# Patient Record
Sex: FEMALE | Race: BLACK OR AFRICAN AMERICAN | NOT HISPANIC OR LATINO | URBAN - METROPOLITAN AREA
[De-identification: names, ages, dates, MRNs, and addresses within clinical notes are randomized per-mention and may not be internally consistent; named-entity substitution may affect disease eponyms.]

---

## 2022-07-14 ENCOUNTER — EMERGENCY (EMERGENCY)
Facility: HOSPITAL | Age: 57
LOS: 1 days | Discharge: HOME | End: 2022-07-14
Admitting: EMERGENCY MEDICINE

## 2022-07-14 VITALS
DIASTOLIC BLOOD PRESSURE: 82 MMHG | OXYGEN SATURATION: 97 % | HEART RATE: 89 BPM | TEMPERATURE: 98 F | SYSTOLIC BLOOD PRESSURE: 146 MMHG | RESPIRATION RATE: 15 BRPM

## 2022-07-14 PROCEDURE — 99284 EMERGENCY DEPT VISIT MOD MDM: CPT

## 2022-07-14 RX ORDER — LIDOCAINE 4 G/100G
1 CREAM TOPICAL ONCE
Refills: 0 | Status: COMPLETED | OUTPATIENT
Start: 2022-07-14 | End: 2022-07-14

## 2022-07-14 RX ORDER — ACETAMINOPHEN 500 MG
975 TABLET ORAL ONCE
Refills: 0 | Status: COMPLETED | OUTPATIENT
Start: 2022-07-14 | End: 2022-07-14

## 2022-07-14 RX ORDER — IBUPROFEN 200 MG
600 TABLET ORAL ONCE
Refills: 0 | Status: COMPLETED | OUTPATIENT
Start: 2022-07-14 | End: 2022-07-14

## 2022-07-14 RX ORDER — CYCLOBENZAPRINE HYDROCHLORIDE 10 MG/1
1 TABLET, FILM COATED ORAL
Qty: 9 | Refills: 0
Start: 2022-07-14 | End: 2022-07-16

## 2022-07-14 RX ORDER — ACETAMINOPHEN 500 MG
2 TABLET ORAL
Qty: 56 | Refills: 0
Start: 2022-07-14 | End: 2022-07-20

## 2022-07-14 RX ORDER — IBUPROFEN 200 MG
1 TABLET ORAL
Qty: 21 | Refills: 0
Start: 2022-07-14 | End: 2022-07-20

## 2022-07-14 RX ADMIN — LIDOCAINE 1 PATCH: 4 CREAM TOPICAL at 17:38

## 2022-07-14 RX ADMIN — Medication 975 MILLIGRAM(S): at 17:38

## 2022-07-14 RX ADMIN — LIDOCAINE 1 PATCH: 4 CREAM TOPICAL at 17:39

## 2022-07-14 RX ADMIN — Medication 600 MILLIGRAM(S): at 17:38
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
## 2022-07-14 NOTE — ED PROVIDER NOTE - EXTREMITY EXAM
b/l ue and le: no obvious swelling, erythema, or deformity, no bony or muscle TTP, FROM of shoulders/eblows/wrsits/digits x 5 as well as hip/knee/ankle, sensation and strength intact distally/no deformity, pain or tenderness, no restriction of movement
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
## 2022-07-14 NOTE — ED PROVIDER NOTE - PATIENT PORTAL LINK FT
You can access the FollowMyHealth Patient Portal offered by [facility] by registering at the following website: [email or web address]. By joining [facility]’s FollowMyHealth portal, you will also be able to view your health information using other applications (apps) compatible with our system.

## 2022-07-14 NOTE — ED PROVIDER NOTE - NS CPE EDP MUSC CERVICAL LOC
no midline tenderness. no deformity. paraspinal tenderness and spasm noted on exam. FROM with mild discomfort with lateral rotation b/l./tenderness

## 2022-07-14 NOTE — ED PROVIDER NOTE - MUSCULOSKELETAL MINIMAL EXAM
left lumbar and left paraspinal tenderness/TENDERNESS left cervical and left lumbar paraspinal tenderness and spasm noted/TENDERNESS
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
